# Patient Record
Sex: MALE | Race: WHITE | URBAN - METROPOLITAN AREA
[De-identification: names, ages, dates, MRNs, and addresses within clinical notes are randomized per-mention and may not be internally consistent; named-entity substitution may affect disease eponyms.]

---

## 2019-12-16 ENCOUNTER — APPOINTMENT (OUTPATIENT)
Dept: URBAN - METROPOLITAN AREA CLINIC 292 | Age: 59
Setting detail: DERMATOLOGY
End: 2019-12-16

## 2019-12-16 DIAGNOSIS — L50.8 OTHER URTICARIA: ICD-10-CM

## 2019-12-16 DIAGNOSIS — L50.3 DERMATOGRAPHIC URTICARIA: ICD-10-CM

## 2019-12-16 PROCEDURE — OTHER COUNSELING: OTHER

## 2019-12-16 PROCEDURE — OTHER PRESCRIPTION: OTHER

## 2019-12-16 PROCEDURE — OTHER INTRAMUSCULAR KENALOG: OTHER

## 2019-12-16 PROCEDURE — 96372 THER/PROPH/DIAG INJ SC/IM: CPT

## 2019-12-16 PROCEDURE — 99202 OFFICE O/P NEW SF 15 MIN: CPT | Mod: 25

## 2019-12-16 RX ORDER — TRIAMCINOLONE ACETONIDE 1 MG/G
CREAM TOPICAL
Qty: 1 | Refills: 2 | Status: ERX | COMMUNITY
Start: 2019-12-16

## 2019-12-16 ASSESSMENT — LOCATION SIMPLE DESCRIPTION DERM
LOCATION SIMPLE: RIGHT BUTTOCK
LOCATION SIMPLE: CHEST
LOCATION SIMPLE: LEFT SHOULDER

## 2019-12-16 ASSESSMENT — LOCATION DETAILED DESCRIPTION DERM
LOCATION DETAILED: LEFT ANTERIOR SHOULDER
LOCATION DETAILED: RIGHT BUTTOCK
LOCATION DETAILED: RIGHT LATERAL SUPERIOR CHEST

## 2019-12-16 ASSESSMENT — LOCATION ZONE DERM
LOCATION ZONE: TRUNK
LOCATION ZONE: ARM
LOCATION ZONE: TRUNK

## 2019-12-16 NOTE — PROCEDURE: INTRAMUSCULAR KENALOG
Consent: The risks of atrophy were reviewed with the patient.
Add Option For Additional Mediation: No
Kenalog Preparation: kenalog
Concentration (Mg/Ml) Of Additional Medication: 2.5
Concentration (Mg/Ml): 40.0
Total Volume (Ccs): 1
Detail Level: None